# Patient Record
Sex: MALE | Race: WHITE | NOT HISPANIC OR LATINO | Employment: UNEMPLOYED | URBAN - NONMETROPOLITAN AREA
[De-identification: names, ages, dates, MRNs, and addresses within clinical notes are randomized per-mention and may not be internally consistent; named-entity substitution may affect disease eponyms.]

---

## 2020-03-16 ENCOUNTER — APPOINTMENT (OUTPATIENT)
Dept: GENERAL RADIOLOGY | Facility: HOSPITAL | Age: 75
End: 2020-03-16

## 2020-05-14 DIAGNOSIS — M25.511 RIGHT SHOULDER PAIN, UNSPECIFIED CHRONICITY: Primary | ICD-10-CM

## 2020-05-21 ENCOUNTER — OFFICE VISIT (OUTPATIENT)
Dept: ORTHOPEDIC SURGERY | Facility: CLINIC | Age: 75
End: 2020-05-21

## 2020-05-21 VITALS
SYSTOLIC BLOOD PRESSURE: 129 MMHG | WEIGHT: 217 LBS | DIASTOLIC BLOOD PRESSURE: 86 MMHG | HEART RATE: 65 BPM | BODY MASS INDEX: 29.39 KG/M2 | TEMPERATURE: 97.4 F | HEIGHT: 72 IN

## 2020-05-21 DIAGNOSIS — M25.511 RIGHT SHOULDER PAIN, UNSPECIFIED CHRONICITY: Primary | ICD-10-CM

## 2020-05-21 PROCEDURE — 99203 OFFICE O/P NEW LOW 30 MIN: CPT | Performed by: ORTHOPAEDIC SURGERY

## 2020-05-21 RX ORDER — LISINOPRIL 2.5 MG/1
TABLET ORAL
COMMUNITY
Start: 2020-02-28

## 2020-05-21 RX ORDER — HYDROCODONE BITARTRATE AND ACETAMINOPHEN 5; 325 MG/1; MG/1
TABLET ORAL
COMMUNITY

## 2020-05-21 RX ORDER — ATORVASTATIN CALCIUM 40 MG/1
TABLET, FILM COATED ORAL
COMMUNITY

## 2020-05-21 RX ORDER — OMEPRAZOLE 20 MG/1
1 CAPSULE, DELAYED RELEASE ORAL
COMMUNITY

## 2020-05-21 RX ORDER — PROPRANOLOL HYDROCHLORIDE 20 MG/1
TABLET ORAL
COMMUNITY
Start: 2020-02-28

## 2020-05-21 RX ORDER — BETAMETHASONE DIPROPIONATE 0.05 %
OINTMENT (GRAM) TOPICAL
COMMUNITY
Start: 2020-03-12

## 2020-05-21 RX ORDER — MELOXICAM 15 MG/1
TABLET ORAL
COMMUNITY
Start: 2017-11-03

## 2020-05-21 RX ORDER — ZOLPIDEM TARTRATE 5 MG/1
TABLET ORAL
COMMUNITY
Start: 2020-03-12

## 2020-05-21 NOTE — PROGRESS NOTES
New Patient Visit      Patient: Sp Long  YOB: 1945  Date of Encounter: 05/21/2020        Chief Complaint:   Chief Complaint   Patient presents with   • Right Shoulder - Pain, Initial Evaluation           HPI:   Sp Long, 74 y.o. male presents for evaluation of right shoulder pain of many years duration.  He did sustain an injury to his shoulder about 30 years ago when he dropped an MO box.  He does not recall specifically how long it took him to recover.  He currently is asymptomatic but describes intermittent right shoulder pain superiorly anteriorly and laterally.  He denies weakness or numbness to his right arm.  He has not had shoulder pain for the past 3 to 4 weeks.        Active Problem List:  Patient Active Problem List   Diagnosis   • Right shoulder pain           Past Medical History:  Past Medical History:   Diagnosis Date   • Diabetes (CMS/HCC)    • High cholesterol    • Hypertension            Past Surgical History:  Past Surgical History:   Procedure Laterality Date   • HAND SURGERY     • TOTAL KNEE ARTHROPLASTY Left 2017   • TOTAL KNEE ARTHROPLASTY Right 2016           Family History:  Family History   Problem Relation Age of Onset   • Cancer Father    • Diabetes Maternal Grandmother            Social History:  Social History     Socioeconomic History   • Marital status:      Spouse name: Not on file   • Number of children: Not on file   • Years of education: Not on file   • Highest education level: Not on file   Tobacco Use   • Smoking status: Never Smoker   • Smokeless tobacco: Never Used   Substance and Sexual Activity   • Alcohol use: Not Currently     Comment: former alcohol use   • Drug use: Never     Body mass index is 29.43 kg/m².      Medications:  Current Outpatient Medications   Medication Sig Dispense Refill   • atorvastatin (LIPITOR) 40 MG tablet Take  by mouth.     • betamethasone dipropionate (DIPROLENE) 0.05 % ointment      • HYDROcodone-acetaminophen (NORCO)  "5-325 MG per tablet hydrocodone 5 mg-acetaminophen 325 mg tablet     • lisinopril (PRINIVIL,ZESTRIL) 2.5 MG tablet      • meloxicam (Mobic) 15 MG tablet 1 PO Q D WITH FOOD     • metFORMIN (GLUCOPHAGE) 850 MG tablet      • omeprazole (priLOSEC) 20 MG capsule Take 1 capsule by mouth.     • propranolol (INDERAL) 20 MG tablet      • zolpidem (AMBIEN) 5 MG tablet        No current facility-administered medications for this visit.            Allergies:  No Known Allergies        Review of Systems:   Review of Systems   Constitutional: Negative.    HENT: Negative.    Eyes: Negative.    Respiratory: Negative.    Cardiovascular: Negative.    Gastrointestinal: Negative.    Endocrine: Negative.    Genitourinary: Negative.    Musculoskeletal: Positive for arthralgias.   Skin: Negative.    Allergic/Immunologic: Negative.    Hematological: Negative.    Psychiatric/Behavioral: Negative.            Physical Exam:   Physical Exam  GENERAL: 74 y.o. male, alert and oriented X 3 in no acute distress.   Visit Vitals  /86   Pulse 65   Temp 97.4 °F (36.3 °C)   Ht 182.9 cm (72\")   Wt 98.4 kg (217 lb)   BMI 29.43 kg/m²         Musculoskeletal:   Examination right shoulder reveals normal contour.  He has no muscle wasting.  He demonstrates slightly decreased internal rotation compared to the left.  He has no tenderness along the bicipital groove acromioclavicular joint is nontender crossarm adduction produces very mild pain.  Neer's and Choudhury impingement signs are negative Jobes test is negative [place]'s test is negative pushoff test is negative neurovascular examination is grossly intact.      Radiology/Labs:     Radiographs from outside source show preservation of subacromial space no degenerative changes of the acromioclavicular joint normal glenohumeral joint.      Assessment & Plan:   74 y.o. male presents with intermittent right shoulder pain of many years duration possibly related to trauma while in .  He currently " has normal radiographs and normal clinical findings.  We discussed his options today and agreed that he would return in the future if symptoms return.  Possibly when he is more symptomatic clinical exam may identify the source of his pain.  Will return as needed.        ICD-10-CM ICD-9-CM   1. Right shoulder pain, unspecified chronicity M25.511 719.41                       This document has been electronically signed by Darren Adams MD   May 21, 2020 14:22